# Patient Record
Sex: MALE | URBAN - METROPOLITAN AREA
[De-identification: names, ages, dates, MRNs, and addresses within clinical notes are randomized per-mention and may not be internally consistent; named-entity substitution may affect disease eponyms.]

---

## 2022-11-04 ENCOUNTER — OFFICE VISIT (OUTPATIENT)
Dept: URBAN - METROPOLITAN AREA CLINIC 10 | Facility: CLINIC | Age: 62
End: 2022-11-04
Payer: COMMERCIAL

## 2022-11-04 DIAGNOSIS — G24.5 BLEPHAROSPASM: Primary | ICD-10-CM

## 2022-11-04 DIAGNOSIS — H11.053 PERIPHERAL PTERYGIUM, PROGRESSIVE, BILATERAL: ICD-10-CM

## 2022-11-04 PROCEDURE — 99204 OFFICE O/P NEW MOD 45 MIN: CPT | Performed by: OPHTHALMOLOGY

## 2022-11-04 PROCEDURE — 92025 CPTRIZED CORNEAL TOPOGRAPHY: CPT | Performed by: OPHTHALMOLOGY

## 2022-11-04 RX ORDER — PREDNISOLONE ACETATE 10 MG/ML
1 % SUSPENSION/ DROPS OPHTHALMIC
Qty: 5 | Refills: 1 | Status: ACTIVE
Start: 2022-11-04

## 2022-11-04 RX ORDER — CIPROFLOXACIN HYDROCHLORIDE 3 MG/ML
0.3 % SOLUTION/ DROPS OPHTHALMIC
Qty: 5 | Refills: 1 | Status: ACTIVE
Start: 2022-11-04

## 2022-11-04 ASSESSMENT — VISUAL ACUITY
OD: 20/60
OS: 20/40

## 2022-11-04 ASSESSMENT — INTRAOCULAR PRESSURE
OS: 14
OD: 23

## 2022-11-04 NOTE — IMPRESSION/PLAN
Impression: Blepharospasm: G24.5. Plan: RUL, patient stated within the past couple months has increased in twitching, patient feels his vision has decreased within the same time frame. Patient states he was treated by Dr. Hyacinth Templeton MD in the past for botox injections.  Will obtain a lid eval with oculoplastics

## 2022-11-04 NOTE — IMPRESSION/PLAN
Impression: Peripheral pterygium, progressive, bilateral: H11.053. Plan: Discussed risks, benefits and alternatives to the patient. Warned patient even with Pterygium surgery there is still a chance the growth can return. Patient warned the need to continue to  wear sunglasses to protect against  ultraviolet light along with irritants like dust and wind. May need to take steroid eye  drops for several weeks following surgery along with ointment and artificial tears. Patient elects at this time for  surgical treatment, Pterygium surgery with grafting .  OD first then OS, after eval with oculoplastics

## 2022-12-29 ENCOUNTER — OFFICE VISIT (OUTPATIENT)
Dept: URBAN - METROPOLITAN AREA CLINIC 10 | Facility: CLINIC | Age: 62
End: 2022-12-29
Payer: COMMERCIAL

## 2022-12-29 DIAGNOSIS — G51.31 CLONIC HEMIFACIAL SPASM, RIGHT: Primary | ICD-10-CM

## 2022-12-29 PROCEDURE — 99214 OFFICE O/P EST MOD 30 MIN: CPT | Performed by: STUDENT IN AN ORGANIZED HEALTH CARE EDUCATION/TRAINING PROGRAM

## 2022-12-29 NOTE — IMPRESSION/PLAN
Impression: Clonic hemifacial spasm, right: G51.31. Plan: Hemifacial spasm was noted on today's exam with significant/severe spasm of the upper and lower face. There are no known neurological problems, hearing loss, history of Bell's palsy, diplopia, or ocular irritation. Present since ~2007 with worsening over time. H/o botox injections in past, last around 2016. Botox helps relieve spasms. Patient believes he had head imaging ~2012. Given the spasms have worsened over time, recommend repeat imaging with MRI brain. I recommend obtaining a MRI with contrast to evaluate the cerebellopontine angle which will help to rule out the possibility of a mass. In addition, some cases of hemifacial spasm result from an ectatic blood vessel irritating the facial nerve. However, in most cases, despite careful history, examination, and imaging, no cause is identified. Once the MRI is completed and no other medical causes are identified, recommend proceeding with medically indicated Botox injection to relieve the spasms. Please schedule in oculoplastics procedure clinic. Discussed this with the patient who voiced understanding.

## 2023-01-16 ENCOUNTER — ADULT PHYSICAL (OUTPATIENT)
Dept: URBAN - METROPOLITAN AREA CLINIC 10 | Facility: CLINIC | Age: 63
End: 2023-01-16

## 2023-01-16 DIAGNOSIS — Z01.818 ENCOUNTER FOR OTHER PREPROCEDURAL EXAMINATION: Primary | ICD-10-CM

## 2023-01-16 DIAGNOSIS — H11.053 PERIPHERAL PTERYGIUM, PROGRESSIVE, BILATERAL: ICD-10-CM

## 2023-01-16 PROCEDURE — 99202 OFFICE O/P NEW SF 15 MIN: CPT | Performed by: PHYSICIAN ASSISTANT

## 2023-01-23 ENCOUNTER — SURGERY (OUTPATIENT)
Dept: URBAN - METROPOLITAN AREA SURGERY 5 | Facility: SURGERY | Age: 63
End: 2023-01-23
Payer: COMMERCIAL

## 2023-01-23 PROCEDURE — 65426 REMOVAL OF EYE LESION: CPT | Performed by: OPHTHALMOLOGY

## 2023-01-23 RX ORDER — ERYTHROMYCIN 5 MG/G
OINTMENT OPHTHALMIC
Qty: 1 | Refills: 3 | Status: INACTIVE
Start: 2023-01-23 | End: 2023-01-23

## 2023-01-23 RX ORDER — ERYTHROMYCIN 5 MG/G
OINTMENT OPHTHALMIC
Qty: 1 | Refills: 1 | Status: ACTIVE
Start: 2023-01-23

## 2023-01-23 RX ORDER — PREDNISOLONE ACETATE 10 MG/ML
1 % SUSPENSION/ DROPS OPHTHALMIC
Qty: 5 | Refills: 1 | Status: ACTIVE
Start: 2023-01-23

## 2023-01-23 RX ORDER — OFLOXACIN 3 MG/ML
0.3 % SOLUTION/ DROPS OPHTHALMIC
Qty: 1 | Refills: 1 | Status: ACTIVE
Start: 2023-01-23

## 2023-01-24 ENCOUNTER — POST-OPERATIVE VISIT (OUTPATIENT)
Dept: URBAN - METROPOLITAN AREA CLINIC 10 | Facility: CLINIC | Age: 63
End: 2023-01-24
Payer: COMMERCIAL

## 2023-01-24 DIAGNOSIS — Z48.810 ENCOUNTER FOR SURGICAL AFTERCARE FOLLOWING SURGERY ON A SENSE ORGAN: Primary | ICD-10-CM

## 2023-01-24 PROCEDURE — 99024 POSTOP FOLLOW-UP VISIT: CPT | Performed by: OPTOMETRIST

## 2023-01-24 NOTE — IMPRESSION/PLAN
Impression: S/P Pterygium Excision with Tisseel OD - 1 Day. Encounter for surgical aftercare following surgery on a sense organ  Z48.810. Plan: Doing well s/p pterygium excision. IOP soft on digiital  Unable to perform GAT due to blepharospasm. Begin ofloxacin, erythromycin and pred qid OD. RTC as scheduled c Dr. Tylor Emanuel in 1 week.

## 2023-01-26 ENCOUNTER — OFFICE VISIT (OUTPATIENT)
Dept: URBAN - METROPOLITAN AREA CLINIC 10 | Facility: CLINIC | Age: 63
End: 2023-01-26
Payer: COMMERCIAL

## 2023-01-26 DIAGNOSIS — G24.5 BLEPHAROSPASM: Primary | ICD-10-CM

## 2023-01-26 PROCEDURE — 64612 DESTROY NERVE FACE MUSCLE: CPT | Performed by: STUDENT IN AN ORGANIZED HEALTH CARE EDUCATION/TRAINING PROGRAM

## 2023-02-14 ENCOUNTER — OFFICE VISIT (OUTPATIENT)
Dept: URBAN - METROPOLITAN AREA CLINIC 34 | Facility: CLINIC | Age: 63
End: 2023-02-14
Payer: COMMERCIAL

## 2023-02-14 DIAGNOSIS — G24.5 BLEPHAROSPASM: ICD-10-CM

## 2023-02-14 DIAGNOSIS — G51.31 CLONIC HEMIFACIAL SPASM, RIGHT: Primary | ICD-10-CM

## 2023-02-14 PROCEDURE — 99213 OFFICE O/P EST LOW 20 MIN: CPT | Performed by: STUDENT IN AN ORGANIZED HEALTH CARE EDUCATION/TRAINING PROGRAM

## 2023-02-14 PROCEDURE — 64612 DESTROY NERVE FACE MUSCLE: CPT | Performed by: STUDENT IN AN ORGANIZED HEALTH CARE EDUCATION/TRAINING PROGRAM

## 2023-02-14 NOTE — IMPRESSION/PLAN
Impression: Clonic hemifacial spasm, right: G51.31. Plan: S/p Botox onjection 1/26/23. Persistent/residual hemifacial spasms that are bothersome to patient. Interfere with ability to drive, work, etc.

R/B/A of additional botox discussed with patient and written informed consent obtained. Total 40u botox injected into right face per pattern diagram. No complications. Return oculoplastics 3-4 months for repeat botox injection. Call sooner prn.